# Patient Record
Sex: FEMALE | Race: WHITE
[De-identification: names, ages, dates, MRNs, and addresses within clinical notes are randomized per-mention and may not be internally consistent; named-entity substitution may affect disease eponyms.]

---

## 2021-08-22 ENCOUNTER — HOSPITAL ENCOUNTER (EMERGENCY)
Dept: HOSPITAL 4 - SED | Age: 66
Discharge: LEFT BEFORE DISCHARGE | End: 2021-08-22
Payer: COMMERCIAL

## 2021-08-22 VITALS — SYSTOLIC BLOOD PRESSURE: 108 MMHG

## 2021-08-22 VITALS — WEIGHT: 88 LBS | SYSTOLIC BLOOD PRESSURE: 122 MMHG | BODY MASS INDEX: 18.47 KG/M2 | HEIGHT: 58 IN

## 2021-08-22 DIAGNOSIS — J18.9: Primary | ICD-10-CM

## 2021-08-22 DIAGNOSIS — Z88.8: ICD-10-CM

## 2021-08-22 DIAGNOSIS — Z20.822: ICD-10-CM

## 2021-08-22 DIAGNOSIS — Z79.899: ICD-10-CM

## 2021-08-22 LAB
ALBUMIN SERPL BCP-MCNC: 2 G/DL (ref 3.4–4.8)
ALT SERPL W P-5'-P-CCNC: 19 U/L (ref 12–78)
ANION GAP SERPL CALCULATED.3IONS-SCNC: < 3 MMOL/L (ref 5–15)
AST SERPL W P-5'-P-CCNC: 39 U/L (ref 10–37)
BASOPHILS NFR BLD MANUAL: 0 % (ref 0–2)
BILIRUB SERPL-MCNC: 0.2 MG/DL (ref 0–1)
BUN SERPL-MCNC: 27 MG/DL (ref 8–21)
CALCIUM SERPL-MCNC: 8.7 MG/DL (ref 8.4–11)
CHLORIDE SERPL-SCNC: 102 MMOL/L (ref 98–107)
CREAT SERPL-MCNC: 0.52 MG/DL (ref 0.55–1.3)
EOSINOPHIL NFR BLD MANUAL: 0 % (ref 0–7)
ERYTHROCYTE [DISTWIDTH] IN BLOOD BY AUTOMATED COUNT: 19.6 % (ref 9–15)
GFR SERPL CREATININE-BSD FRML MDRD: 152 ML/MIN (ref 90–?)
GLUCOSE SERPL-MCNC: 109 MG/DL (ref 70–99)
HCT VFR BLD AUTO: 26 % (ref 36–48)
HGB BLD-MCNC: 8.1 G/DL (ref 12–16)
INR PPP: 1 (ref 0.8–1.2)
LYMPHOCYTES NFR BLD MANUAL: 10 % (ref 20–46)
MCH RBC QN AUTO: 24 PG (ref 27–31)
MCHC RBC AUTO-ENTMCNC: 31 % (ref 32–36)
MCV RBC AUTO: 77 FL (ref 79–98)
METAMYELOCYTES NFR BLD MANUAL: 14 % (ref 0–0)
MONOCYTES # BLD MANUAL: 7 % (ref 0–11)
MYELOCYTES NFR BLD MANUAL: 8 % (ref 0–0)
NEUTS BAND NFR BLD MANUAL: 36 % (ref 0–6)
PLATELET # BLD AUTO: 337 K/UL (ref 130–430)
POTASSIUM SERPL-SCNC: 4.2 MMOL/L (ref 3.5–5.1)
PROMYELOCYTES NFR BLD MANUAL: 0 % (ref 0–0)
PROTHROMBIN TIME: 10.7 SECS (ref 9.5–12.5)
RBC # BLD AUTO: 3.36 MIL/UL (ref 4.2–6.2)
SODIUM SERPLBLD-SCNC: 137 MMOL/L (ref 136–145)
WBC # BLD AUTO: 5.9 K/UL (ref 4.8–10.8)

## 2021-08-22 PROCEDURE — 96365 THER/PROPH/DIAG IV INF INIT: CPT

## 2021-08-22 PROCEDURE — 85730 THROMBOPLASTIN TIME PARTIAL: CPT

## 2021-08-22 PROCEDURE — 85610 PROTHROMBIN TIME: CPT

## 2021-08-22 PROCEDURE — 85007 BL SMEAR W/DIFF WBC COUNT: CPT

## 2021-08-22 PROCEDURE — 80053 COMPREHEN METABOLIC PANEL: CPT

## 2021-08-22 PROCEDURE — 87040 BLOOD CULTURE FOR BACTERIA: CPT

## 2021-08-22 PROCEDURE — 96372 THER/PROPH/DIAG INJ SC/IM: CPT

## 2021-08-22 PROCEDURE — 84484 ASSAY OF TROPONIN QUANT: CPT

## 2021-08-22 PROCEDURE — 36415 COLL VENOUS BLD VENIPUNCTURE: CPT

## 2021-08-22 PROCEDURE — 85027 COMPLETE CBC AUTOMATED: CPT

## 2021-08-22 PROCEDURE — 71045 X-RAY EXAM CHEST 1 VIEW: CPT

## 2021-08-22 PROCEDURE — 85379 FIBRIN DEGRADATION QUANT: CPT

## 2021-08-22 PROCEDURE — 93005 ELECTROCARDIOGRAM TRACING: CPT

## 2021-08-22 PROCEDURE — 99285 EMERGENCY DEPT VISIT HI MDM: CPT

## 2021-08-22 PROCEDURE — 87426 SARSCOV CORONAVIRUS AG IA: CPT

## 2021-08-22 PROCEDURE — 83880 ASSAY OF NATRIURETIC PEPTIDE: CPT

## 2021-08-22 NOTE — NUR
End of life care decisions discussed with  by Dr. Henderson. Opportunity for 
questions and concerns addressed. Patient's code status is DNR/DNI paperwork 
completed and placed in chart.

## 2021-08-22 NOTE — NUR
Patient refusing tylenol prescribed by Dr. Rollins. Patient requesting 
dilaudid. Patient states she is wanting to sign AMA forms and leave hosptial. 
MD notified.

## 2021-08-22 NOTE — NUR
Dr. Henderson speaking with Dr. Rollins regarding admission. Per Dr. Rollins she 
will input her own admit orders.

## 2021-08-22 NOTE — NUR
Patient does not wish to proceed with medical care recommended by Dr. Rollins. 
 Patient given information related to possible complications, up to and 
including death, which could occur as a result of leaving hospital at this 
time.  Patient verbalizes understanding of risks involved leaving against 
medical advice.  Patient has signed AMA form.

## 2021-08-22 NOTE — NUR
Assumed total care of patient. Patient AAO x4 BIB [facility] [employer] c/o chest pain 
that started while driving in her car unprovoked. Patient reports pain 10/10 
radiates to right arm. Patient c/o nausea & cough. Patient arrived on nasal 
cannula 4L, states she is on oxygen at home. Patient hx MI & Lupus. IV site is 
22g left hand, no signs of infiltration. Patient received Aspirin 324mg PO and 
nitroglycerin 0.4 x 1, no improvement. Patient placed on cardiac monitor, VSS, 
breathing even and unlabored. Will continue to monitor.

## 2021-08-22 NOTE — NUR
# 20 gauge angiocath placed to RIGHT FOREARM.  Use of asceptic technique.  
Opsite placed over site.  Blood return noted.  Blood for lab drawn from site.  
Flushed with 10 cc of normal saline.  No evidence of infiltration noted.  
Patient tolerated well.